# Patient Record
Sex: FEMALE | Race: OTHER | HISPANIC OR LATINO | URBAN - METROPOLITAN AREA
[De-identification: names, ages, dates, MRNs, and addresses within clinical notes are randomized per-mention and may not be internally consistent; named-entity substitution may affect disease eponyms.]

---

## 2020-08-22 ENCOUNTER — EMERGENCY (EMERGENCY)
Facility: HOSPITAL | Age: 55
LOS: 1 days | End: 2020-08-22
Attending: STUDENT IN AN ORGANIZED HEALTH CARE EDUCATION/TRAINING PROGRAM
Payer: MEDICAID

## 2020-08-22 VITALS
SYSTOLIC BLOOD PRESSURE: 120 MMHG | OXYGEN SATURATION: 95 % | TEMPERATURE: 98 F | WEIGHT: 169.98 LBS | DIASTOLIC BLOOD PRESSURE: 77 MMHG | HEART RATE: 101 BPM | RESPIRATION RATE: 20 BRPM | HEIGHT: 64 IN

## 2020-08-22 LAB
ALBUMIN SERPL ELPH-MCNC: 4.3 G/DL — SIGNIFICANT CHANGE UP (ref 3.3–5)
ALP SERPL-CCNC: 96 U/L — SIGNIFICANT CHANGE UP (ref 40–120)
ALT FLD-CCNC: 28 U/L — SIGNIFICANT CHANGE UP (ref 10–45)
ANION GAP SERPL CALC-SCNC: 14 MMOL/L — SIGNIFICANT CHANGE UP (ref 5–17)
APTT BLD: 24.5 SEC — LOW (ref 27.5–35.5)
AST SERPL-CCNC: 33 U/L — SIGNIFICANT CHANGE UP (ref 10–40)
BASOPHILS # BLD AUTO: 0.06 K/UL — SIGNIFICANT CHANGE UP (ref 0–0.2)
BASOPHILS NFR BLD AUTO: 0.5 % — SIGNIFICANT CHANGE UP (ref 0–2)
BILIRUB SERPL-MCNC: 0.3 MG/DL — SIGNIFICANT CHANGE UP (ref 0.2–1.2)
BUN SERPL-MCNC: 16 MG/DL — SIGNIFICANT CHANGE UP (ref 7–23)
CALCIUM SERPL-MCNC: 10.3 MG/DL — SIGNIFICANT CHANGE UP (ref 8.4–10.5)
CHLORIDE SERPL-SCNC: 101 MMOL/L — SIGNIFICANT CHANGE UP (ref 96–108)
CO2 SERPL-SCNC: 26 MMOL/L — SIGNIFICANT CHANGE UP (ref 22–31)
CREAT SERPL-MCNC: 1.18 MG/DL — SIGNIFICANT CHANGE UP (ref 0.5–1.3)
EOSINOPHIL # BLD AUTO: 0.24 K/UL — SIGNIFICANT CHANGE UP (ref 0–0.5)
EOSINOPHIL NFR BLD AUTO: 2.2 % — SIGNIFICANT CHANGE UP (ref 0–6)
GLUCOSE SERPL-MCNC: 131 MG/DL — HIGH (ref 70–99)
HCT VFR BLD CALC: 40.5 % — SIGNIFICANT CHANGE UP (ref 34.5–45)
HGB BLD-MCNC: 13.8 G/DL — SIGNIFICANT CHANGE UP (ref 11.5–15.5)
IMM GRANULOCYTES NFR BLD AUTO: 0.3 % — SIGNIFICANT CHANGE UP (ref 0–1.5)
INR BLD: 1.03 RATIO — SIGNIFICANT CHANGE UP (ref 0.88–1.16)
LYMPHOCYTES # BLD AUTO: 1.88 K/UL — SIGNIFICANT CHANGE UP (ref 1–3.3)
LYMPHOCYTES # BLD AUTO: 17.2 % — SIGNIFICANT CHANGE UP (ref 13–44)
MCHC RBC-ENTMCNC: 31.4 PG — SIGNIFICANT CHANGE UP (ref 27–34)
MCHC RBC-ENTMCNC: 34.1 GM/DL — SIGNIFICANT CHANGE UP (ref 32–36)
MCV RBC AUTO: 92 FL — SIGNIFICANT CHANGE UP (ref 80–100)
MONOCYTES # BLD AUTO: 0.6 K/UL — SIGNIFICANT CHANGE UP (ref 0–0.9)
MONOCYTES NFR BLD AUTO: 5.5 % — SIGNIFICANT CHANGE UP (ref 2–14)
NEUTROPHILS # BLD AUTO: 8.13 K/UL — HIGH (ref 1.8–7.4)
NEUTROPHILS NFR BLD AUTO: 74.3 % — SIGNIFICANT CHANGE UP (ref 43–77)
NRBC # BLD: 0 /100 WBCS — SIGNIFICANT CHANGE UP (ref 0–0)
PLATELET # BLD AUTO: 275 K/UL — SIGNIFICANT CHANGE UP (ref 150–400)
POTASSIUM SERPL-MCNC: 3.7 MMOL/L — SIGNIFICANT CHANGE UP (ref 3.5–5.3)
POTASSIUM SERPL-SCNC: 3.7 MMOL/L — SIGNIFICANT CHANGE UP (ref 3.5–5.3)
PROT SERPL-MCNC: 7.8 G/DL — SIGNIFICANT CHANGE UP (ref 6–8.3)
PROTHROM AB SERPL-ACNC: 12.2 SEC — SIGNIFICANT CHANGE UP (ref 10.6–13.6)
RBC # BLD: 4.4 M/UL — SIGNIFICANT CHANGE UP (ref 3.8–5.2)
RBC # FLD: 11.9 % — SIGNIFICANT CHANGE UP (ref 10.3–14.5)
SARS-COV-2 RNA SPEC QL NAA+PROBE: SIGNIFICANT CHANGE UP
SODIUM SERPL-SCNC: 141 MMOL/L — SIGNIFICANT CHANGE UP (ref 135–145)
TROPONIN T, HIGH SENSITIVITY RESULT: <6 NG/L — SIGNIFICANT CHANGE UP (ref 0–51)
WBC # BLD: 10.94 K/UL — HIGH (ref 3.8–10.5)
WBC # FLD AUTO: 10.94 K/UL — HIGH (ref 3.8–10.5)

## 2020-08-22 PROCEDURE — 71045 X-RAY EXAM CHEST 1 VIEW: CPT | Mod: 26

## 2020-08-22 PROCEDURE — 99220: CPT

## 2020-08-22 PROCEDURE — 70496 CT ANGIOGRAPHY HEAD: CPT | Mod: 26

## 2020-08-22 PROCEDURE — 70498 CT ANGIOGRAPHY NECK: CPT | Mod: 26

## 2020-08-22 PROCEDURE — 93010 ELECTROCARDIOGRAM REPORT: CPT

## 2020-08-22 RX ORDER — ATORVASTATIN CALCIUM 80 MG/1
80 TABLET, FILM COATED ORAL AT BEDTIME
Refills: 0 | Status: DISCONTINUED | OUTPATIENT
Start: 2020-08-22 | End: 2020-08-26

## 2020-08-22 RX ORDER — ASPIRIN/CALCIUM CARB/MAGNESIUM 324 MG
81 TABLET ORAL DAILY
Refills: 0 | Status: DISCONTINUED | OUTPATIENT
Start: 2020-08-22 | End: 2020-08-26

## 2020-08-22 RX ORDER — SODIUM CHLORIDE 9 MG/ML
1000 INJECTION, SOLUTION INTRAVENOUS ONCE
Refills: 0 | Status: DISCONTINUED | OUTPATIENT
Start: 2020-08-22 | End: 2020-08-22

## 2020-08-22 RX ADMIN — ATORVASTATIN CALCIUM 80 MILLIGRAM(S): 80 TABLET, FILM COATED ORAL at 22:25

## 2020-08-22 RX ADMIN — Medication 81 MILLIGRAM(S): at 22:25

## 2020-08-22 RX ADMIN — Medication 30 MILLILITER(S): at 19:52

## 2020-08-22 NOTE — ED ADULT NURSE NOTE - OBJECTIVE STATEMENT
PT 54 year old female, A/O x3. PT came in through triage via wheelchair due to slurred speech. PMH- HTN. PT was eating lunch around 1538 when she began to experience slurred speech and "stars" in her vision. PT stated "my daughter said my head fell back, but I do not recall that or slurred speech". Slurred speech resolved then occurred again and that is when PT family had PT come to ED. Upon entry to ED, slurred speech resolved and speech is clear and coherent. Complaining of headache, dizziness, and nausea. . Neuro assessment- pupils 3mm, round, reactive, full ROM, equal sensations , denies numbness/ tingling. States at baseline she see "floaters" in vision. Denies SOB, chest pain, fever, chills, cough, vomiting, diarrhea, dysuria. Interventions- placed on cardiac monitor and continuous pulse oximetry. PT 54 year old female, A/O x3. PT came in through triage via wheelchair due to slurred speech. PMH- HTN. PT was eating lunch around 1538 when she began to experience slurred speech and "stars" in her vision. PT stated "my daughter said my head fell back, but I do not recall that or slurred speech". Slurred speech resolved then occurred again and that is when PT family had PT come to ED. Upon entry to ED, slurred speech resolved and speech is clear and coherent. Complaining of headache, dizziness, generalized weakness, and nausea. . Neuro assessment- pupils 3mm, round, reactive, full ROM, equal sensations , denies numbness/ tingling. States at baseline she see "floaters" in vision. Denies SOB, chest pain, fever, chills, cough, vomiting, diarrhea, dysuria. Interventions- placed on cardiac monitor and continuous pulse oximetry.

## 2020-08-22 NOTE — ED CDU PROVIDER INITIAL DAY NOTE - DETAILS
Transient Slurred Speech  -TELE  -NEURO CHECKS  -MRI Head  -[facility] EVALS  -NEURO FOLLOWING  -CASE D/W ATTENDING Dr. Rueda

## 2020-08-22 NOTE — ED CDU PROVIDER INITIAL DAY NOTE - OBJECTIVE STATEMENT
53yo F hx htn p/w 2 episodes of slurred speech witnessed by daughter brief and self resolved. Resolved upon ED eval. Pt states that she "saw stars" but did not remember having slurred speech. No hx of similar sx in past. Asymptomatic currently. LKW 330pm. No HA, n/v, abd pain, dizziness, cp, sob, lightheadedness. 53yo F hx htn p/w 2 episodes of slurred speech witnessed by daughter brief and self resolved. Resolved upon ED eval. Pt states that she "saw stars" but did not remember having slurred speech. No hx of similar sx in past. Asymptomatic currently. LKW 330pm. No HA, n/v, abd pain, dizziness, cp, sob, lightheadedness.    In ED 53yo F hx htn p/w 2 episodes of slurred speech witnessed by daughter brief and self resolved. Resolved upon ED eval. Pt states that she "saw stars" but did not remember having slurred speech. No hx of similar sx in past. Asymptomatic currently. LKW 330pm. No HA, n/v, abd pain, dizziness, cp, sob, lightheadedness.    In ED pt was code stroke upon arrival. CTH/CTA head/neck negative for acute stroke. Labs non-actionable. CXR clear lungs. Neuro evaluated pt, recommended MRI. Pt sent to CDU fro MRI head, neuro checks, tele monitoring, and continued evaluation. Case d/w ED attending Dr. Rueda.

## 2020-08-22 NOTE — ED CDU PROVIDER INITIAL DAY NOTE - ATTENDING CONTRIBUTION TO CARE
Attending MD Rueda:   I personally have seen and examined this patient.  ACP, Resident, medical student note reviewed and agree on plan of care and except where noted.     see me emergency department provider note from same day for details.

## 2020-08-22 NOTE — ED PROVIDER NOTE - ATTENDING CONTRIBUTION TO CARE
Attending MD Rueda:   I personally have seen and examined this patient.  ACP, Resident, medical student note reviewed and agree on plan of care and except where noted.     54y F PMH HTN presents with dysarthria. LKN 3:30pm. Patient daughter who is a nurse, patient developed slurred speech, lasting several minutes, self-resolved, recurred several minutes later. Patient has no recollection of this event.    On exam patient is well appearing, tachycardic otherwise vitals wnl, tachy regular s1s2, lungs clear, abdomen soft ntnd, NIHSS 0, no neuro deficits, fluid speech, A+O x 3.    Concern for TIA, etiology of tachycardia unclear. Code Stroke called, will obtain imaging, labs, reassess.

## 2020-08-22 NOTE — ED PROVIDER NOTE - OBJECTIVE STATEMENT
53yo F hx htn p/w 2 episodes of slurred speech witnessed by daughter brief and self resolved. Resolved upon ED eval. Pt states that she "saw stars" but did not remember having slurred speech. No hx of similar sx in past. Asymptomatic currently. LKW 330pm. No HA, n/v, abd pain, dizziness, cp, sob, lightheadedness.

## 2020-08-22 NOTE — ED ADULT NURSE NOTE - NS_ED_NURSE_TEACHING_TOPIC_ED_A_ED
signs & symptoms of a stroke-->call 911, education done with teach back, medication compliance, follow-up care.

## 2020-08-22 NOTE — ED CDU PROVIDER INITIAL DAY NOTE - PROGRESS NOTE DETAILS
Pt just arrived in CDU after covid resulted (negative). Patient seen at bedside in NAD.  VSS.  Patient resting comfortably without complaints. Will put on tele monitor. Pt denies slurred speech, vision changes, numbness/tingling, weakness, difficulty walking. Appears well. Neuro exam intact, no focal deficits or abnormal findings. Will continue to monitor. - Jamison De Jesus PA-C

## 2020-08-22 NOTE — ED ADULT NURSE REASSESSMENT NOTE - NS ED NURSE REASSESS COMMENT FT1
Report received from ANIA Ogden. Patient A&Ox4, breathing spontaneous and unlabored, vitals stable, given maalox as per MD orders. Patient comfortable otherwise. Denies pain. Bed locked and in lowest position for safety, awaiting covid result prior to going to CDU. Bed locked and in lowest position for safety.
Daughter, float nurse from upstairs, at bed side, assisting PT to ambulate to bathroom as approved by ED Resident Alexander Moralez and ED MD Roopa Rueda. PT states "I feel slightly lethargic".
2300 pm Patient fully alert and ambulatory, denies pain or discomfort, No neuro deficits noted. (see neuro sheet). calm and appropriated. on the phone with family.

## 2020-08-22 NOTE — ED CDU PROVIDER INITIAL DAY NOTE - FAMILY HISTORY
Mother  Still living? Unknown  Family history of stroke, Age at diagnosis: Age Unknown  Family history of TIAs, Age at diagnosis: Age Unknown     Aunt  Still living? Unknown  Family history of stroke, Age at diagnosis: Age Unknown  Family history of TIAs, Age at diagnosis: Age Unknown

## 2020-08-22 NOTE — ED PROVIDER NOTE - PHYSICAL EXAMINATION
Gen: Well appearing, NAD  Head: NCAT  HEENT: PERRL, MMM, normal conjunctiva, anicteric, neck supple  Lung: CTAB, no adventitious sounds  CV: mildly tachycardic, no murmurs  Abd: soft, NTND, no rebound or guarding, no CVAT  MSK: No edema, no visible deformities  Neuro: CN II-XII grossly intact. 5/5 strength and normal sensation in all extremities. Ambulatory with stable gait.  fluid speech.  Skin: Warm and dry, no evidence of rash  Psych: normal mood and affect

## 2020-08-22 NOTE — CONSULT NOTE ADULT - ASSESSMENT
53yo woman with PMH of HTN presents as code stroke with slurring of speech today. Patient reports feeling lethargic and seeing flashing circles. Daughter noted 2 episodes of slurred speech with an episode where her head fell back with ?loc. LKN at 1538h. NIHSS of 0. Pre-MRS of 0. CT head and CTA were unremarkable. Patient is not a candidate for tPA or thrombectomy since symptoms have resolved and there is no LVO.    Impression: Transient dysarthria and visual disturbance due to unknown etiology, now resolved, possibly due to metabolic encephalopathy vs. TIA.    Plan:  [] permissive HTN for 24 hours up to 220/110  [] gradual normotension after 24 hrs  [] telemetry monitoring    Imaging   [x] CT head  [x] CTA H&N  [] MRI head non contrast. Can perform outpatient with Dr. Armas in 1-2 days. [phone]  [] STAT repeat CTH if change in neuro status  [] loop recorder outpt vs inpatient    Meds  [] ASA 81 daily  [] start atorva 80 mg daily, titrate based on lipid profile  [] DVT ppx    Studies  [] TTE with bubble inpatient vs outpatient    Labs   [] CBC, BMP  [] Lipid panel  [] HbA1C    Case discussed with Dr. Worrell. 55yo woman with PMH of HTN presents as code stroke with slurring of speech today. Patient reports feeling lethargic and seeing flashing circles. Daughter noted 2 episodes of slurred speech with an episode where her head fell back with ?loc. LKN at 1538h. NIHSS of 0. Pre-MRS of 0. CT head and CTA were unremarkable. Patient is not a candidate for tPA or thrombectomy since symptoms have resolved and there is no LVO.    Impression: Transient dysarthria and visual disturbance due to unknown etiology, now resolved, possibly due to metabolic encephalopathy vs. TIA.    Plan:  [] permissive HTN for 24 hours up to 220/110  [] gradual normotension after 24 hrs  [] telemetry monitoring    Imaging   [x] CT head  [x] CTA H&N  [] MRI head non contrast. Can perform outpatient with Dr. Armas in 1-2 days. [phone]  [] STAT repeat CTH if change in neuro status  [] loop recorder outpt vs inpatient      *** Stroke fellow addendum***  MRI brain was reviewed, no sign of acute abnormality was seen. Previously CTH and CTA H&N were also normal. Transient dysarthria with lethargy are also not localizing findings. Would recommend for an outpatient follow up, st this point patient's symptoms are not considered to neurological, however further outpatient work up and follow up is necessary.   Meds  [] ASA 81 daily  [] start atorva 80 mg daily, titrate based on lipid profile  [] DVT ppx    Studies  [] TTE with bubble inpatient vs outpatient    Labs   [] CBC, BMP  [] Lipid panel  [] HbA1C    Case discussed with Dr. Worrell.

## 2020-08-22 NOTE — CONSULT NOTE ADULT - SUBJECTIVE AND OBJECTIVE BOX
Neurology  Consult Note  08-22-20    Name:  CHANELL VILLALOBOS; 54y (1965)    Neurology consult: 53yo woman with PMH of HTN presents as code stroke with slurring of speech today. Patient reports she was having lunch with her daughter, who is a nurse at [employer], when she felt lethargic and saw flashing circles. Patient reports her daughter noted slurring of speech and that her head fell back with ?loc. Symptoms resolved but the slurring of speech occurred again with LKN at 1538h. Patient does not recall either event of slurred speech but she felt hot when the events occurred. Patient in the ED reports to have persistent lethargy, but denies visual disturbances, blurry or double vision, headache, focal weakness, numbness, or changes in speech or difficulty swallowing. Patient performs ADLs independently and takes aspirin occasionally. NIHSS of 0. Pre-MRS of 0. CT head and CTA were unremarkable. Patient is not a candidate for tPA or thrombectomy since symptoms have resolved and there is no LVO.      Review of Systems:  As states in HPI.        PMHx: HTN    Vitals:  T(C): 36.8 (08-22-20 @ 18:27), Max: 37.1 (08-22-20 @ 17:13)  HR: 89 (08-22-20 @ 18:27) (89 - 116)  BP: 129/92 (08-22-20 @ 18:27) (120/77 - 142/79)  RR: 21 (08-22-20 @ 18:27) (18 - 21)  SpO2: 99% (08-22-20 @ 18:27) (95% - 99%)    Labs:                        13.8   10.94 )-----------( 275      ( 22 Aug 2020 17:02 )             40.5     08-22    141  |  101  |  16  ----------------------------<  131<H>  3.7   |  26  |  1.18    Ca    10.3      22 Aug 2020 17:02    TPro  7.8  /  Alb  4.3  /  TBili  0.3  /  DBili  x   /  AST  33  /  ALT  28  /  AlkPhos  96  08-22    CAPILLARY BLOOD GLUCOSE  POCT Blood Glucose.: 135 mg/dL (22 Aug 2020 16:54)    LIVER FUNCTIONS - ( 22 Aug 2020 17:02 )  Alb: 4.3 g/dL / Pro: 7.8 g/dL / ALK PHOS: 96 U/L / ALT: 28 U/L / AST: 33 U/L / GGT: x             PT/INR - ( 22 Aug 2020 17:02 )   PT: 12.2 sec;   INR: 1.03 ratio    PTT - ( 22 Aug 2020 17:02 )  PTT:24.5 sec      PHYSICAL EXAMINATION:  General: Well-developed, well nourished, in no acute distress.  Eyes: Conjunctiva and sclera clear.  Neurologic:  - Mental Status:  Alert, awake, oriented to person, place, and time; Speech is fluent with intact naming, repetition, and comprehension; Good overall fund of knowledge  - Cranial Nerves II-XII:    II:  Visual fields are full to confrontation; Pupils are equal, round, and reactive to light  III, IV, VI:  Extraocular movements are intact without nystagmus.  V:  Facial sensation is intact in the V1-V3 distribution bilaterally.  VII:  Face is symmetric with normal eye closure and smile  VIII:  Hearing is intact to finger rub.  IX, X:  Uvula is midline and soft palate rises symmetrically  XI:  Head turning and shoulder shrug are intact.  XII:  Tongue protrudes in the midline.  - Motor:  Strength is 5/5 throughout.  There is no pronator drift.  Normal muscle bulk and tone throughout.  - Reflexes:  2+ and symmetric at the biceps, triceps, brachioradialis, knees, and ankles.  Plantar responses is down in the L and mute in the R.  - Sensory:  Intact throughout to light touch and pin prick.  - Gait:   Deferred    Radiology:  < from: CT Angio Head w/ IV Cont (08.22.20 @ 17:13) >  IMPRESSION:  Noncontrast CT brain:  No acute intracranial mass effect, hemorrhage, midline shift or extra-axial fluid collection.    CT angiography neck:  No evidence of hemodynamically significant stenosis using NASCET criteria. Patent vertebral arteries. No evidence of vascular dissection.    CT angiography brain:  No major vessel occlusion, proximal stenosis or aneurysm.

## 2020-08-23 VITALS
TEMPERATURE: 98 F | RESPIRATION RATE: 17 BRPM | HEART RATE: 72 BPM | OXYGEN SATURATION: 98 % | DIASTOLIC BLOOD PRESSURE: 75 MMHG | SYSTOLIC BLOOD PRESSURE: 117 MMHG

## 2020-08-23 LAB
A1C WITH ESTIMATED AVERAGE GLUCOSE RESULT: 4.8 % — SIGNIFICANT CHANGE UP (ref 4–5.6)
CHOLEST SERPL-MCNC: 159 MG/DL — SIGNIFICANT CHANGE UP (ref 10–199)
ESTIMATED AVERAGE GLUCOSE: 91 MG/DL — SIGNIFICANT CHANGE UP (ref 68–114)
HDLC SERPL-MCNC: 61 MG/DL — SIGNIFICANT CHANGE UP
LIPID PNL WITH DIRECT LDL SERPL: 86 MG/DL — SIGNIFICANT CHANGE UP
TOTAL CHOLESTEROL/HDL RATIO MEASUREMENT: 2.6 RATIO — LOW (ref 3.3–7.1)
TRIGL SERPL-MCNC: 62 MG/DL — SIGNIFICANT CHANGE UP (ref 10–149)

## 2020-08-23 PROCEDURE — 80053 COMPREHEN METABOLIC PANEL: CPT

## 2020-08-23 PROCEDURE — 99285 EMERGENCY DEPT VISIT HI MDM: CPT | Mod: 25

## 2020-08-23 PROCEDURE — 70496 CT ANGIOGRAPHY HEAD: CPT

## 2020-08-23 PROCEDURE — 99217: CPT

## 2020-08-23 PROCEDURE — U0003: CPT

## 2020-08-23 PROCEDURE — 84484 ASSAY OF TROPONIN QUANT: CPT

## 2020-08-23 PROCEDURE — G0378: CPT

## 2020-08-23 PROCEDURE — 70551 MRI BRAIN STEM W/O DYE: CPT | Mod: 26

## 2020-08-23 PROCEDURE — 71045 X-RAY EXAM CHEST 1 VIEW: CPT

## 2020-08-23 PROCEDURE — 85027 COMPLETE CBC AUTOMATED: CPT

## 2020-08-23 PROCEDURE — 85730 THROMBOPLASTIN TIME PARTIAL: CPT

## 2020-08-23 PROCEDURE — 83036 HEMOGLOBIN GLYCOSYLATED A1C: CPT

## 2020-08-23 PROCEDURE — 70498 CT ANGIOGRAPHY NECK: CPT

## 2020-08-23 PROCEDURE — 93005 ELECTROCARDIOGRAM TRACING: CPT

## 2020-08-23 PROCEDURE — 80061 LIPID PANEL: CPT

## 2020-08-23 PROCEDURE — 70551 MRI BRAIN STEM W/O DYE: CPT

## 2020-08-23 PROCEDURE — 85610 PROTHROMBIN TIME: CPT

## 2020-08-23 PROCEDURE — 70450 CT HEAD/BRAIN W/O DYE: CPT

## 2020-08-23 PROCEDURE — 82962 GLUCOSE BLOOD TEST: CPT

## 2020-08-23 RX ORDER — ALPRAZOLAM 0.25 MG
0.25 TABLET ORAL ONCE
Refills: 0 | Status: DISCONTINUED | OUTPATIENT
Start: 2020-08-23 | End: 2020-08-23

## 2020-08-23 RX ADMIN — Medication 0.25 MILLIGRAM(S): at 08:44

## 2020-08-23 NOTE — ED CDU PROVIDER DISPOSITION NOTE - PATIENT PORTAL LINK FT
You can access the FollowMyHealth Patient Portal offered by [facility] by registering at the following website: [email or web address]. By joining [facility]’s FollowMyHealth portal, you will also be able to view your health information using other applications (apps) compatible with our system.

## 2020-08-23 NOTE — ED CDU PROVIDER SUBSEQUENT DAY NOTE - SOCIAL CONCERNS
[facility]    Phone Message    May a detailed message be left on voicemail: yes    Reason for Call: Symptoms or Concerns     If patient has red-flag symptoms, warm transfer to triage line    Current symptom or concern: Patient states she still has not received a call back back her CT and lab results and is still experiencing pain on her neck. Please call patient back.      Action Taken: Message routed to:  [facility] ([facility] Rheumatology     None

## 2020-08-23 NOTE — ED CDU PROVIDER DISPOSITION NOTE - CLINICAL COURSE
53 yo female PMHx HTN p/w 2 episodes of slurred speech witnessed by daughter while sitting at lunch. Per daughter pt began slurring words and seemed to be "in a daze", head rolled back (no seizure activity or LOC) and symptoms spontaneously resolved after a few seconds. About half hour later same incident occurred again and spontaneously resolved. Sxs were resolved upon ED eval. Pt states that she "saw stars" but did not remember having slurred speech. No hx of similar sx in past. LKW 330pm. No HA, n/v, abd pain, dizziness, cp, sob, lightheadedness. +family hx of strokes (mother and aunt)  ED Course: Code stroke called upon arrival. CTH/CTA head/neck negative for acute stroke. Labs non-actionable. CXR clear lungs. Neuro evaluated pt, recommended MRI. Pt sent to CDU fro MRI head, neuro checks, tele monitoring, and continued evaluation. Pt did well in CDU overnight. She had no adverse events on tele and sh remained asymptomatic overnight w/ no return of her presenting symptoms. Neuro examination remained unremarkable w/ no focal deficits or abnormal findings. MRI resulted as ____________________. Neurology re-evaluated pt in AM w/ their attending and recommended _____________. 55 yo female PMHx HTN p/w 2 episodes of slurred speech witnessed by daughter while sitting at lunch. Per daughter pt began slurring words and seemed to be "in a daze", head rolled back (no seizure activity or LOC) and symptoms spontaneously resolved after a few seconds. About half hour later same incident occurred again and spontaneously resolved. Sxs were resolved upon ED eval. Pt states that she "saw stars" but did not remember having slurred speech. No hx of similar sx in past. LKW 330pm. No HA, n/v, abd pain, dizziness, cp, sob, lightheadedness. +family hx of strokes (mother and aunt)  ED Course: Code stroke called upon arrival. CTH/CTA head/neck negative for acute stroke. Labs non-actionable. CXR clear lungs. Neuro evaluated pt, recommended MRI. Pt sent to CDU fro MRI head, neuro checks, tele monitoring, and continued evaluation. Pt did well in CDU overnight. She had no adverse events on tele and sh remained asymptomatic overnight w/ no return of her presenting symptoms. Neuro examination remained unremarkable w/ no focal deficits or abnormal findings. MRI returned as a normal study. Discussed with Neuro resident who cleared pt for d/c w/ outpt follow up. Seen by Dr. Sutherland who agrees w/ d/c home

## 2020-08-23 NOTE — ED CDU PROVIDER DISPOSITION NOTE - NSDCPRINTRESULTS_ED_ALL_ED
Patient requests all Lab and Radiology Results on their Discharge Instructions
(3) no apparent problem

## 2020-08-23 NOTE — ED CDU PROVIDER SUBSEQUENT DAY NOTE - NEUROLOGICAL MOTOR
Problem: Respiratory Impairment - Respiratory Therapy 253  Intervention: Inhaled medication delivery  Intervention Status  Done         normal

## 2020-08-23 NOTE — ED CDU PROVIDER SUBSEQUENT DAY NOTE - ATTENDING CONTRIBUTION TO CARE
Patient seen and examined at bedside. Patient is awake,alert,oriented x 3. Patient is well appearing and in no acute distress. Patient's chest is clear to ausculation, +s1s2. Abdomen is soft nd/nt +BS. Extremity with no swelling or calf tenderness. CN2-12 intact 5/5 UE/LE nml sensation. Stable gait. Labs and MR results reviewed. patient states she feels better, no episodes of slurred speech. Discharge plan discussed with patient and in agreement with plan.

## 2020-08-23 NOTE — ED CDU PROVIDER SUBSEQUENT DAY NOTE - CRANIAL NERVE AND PUPILLARY EXAM
No dysmetria or dysdiadochokinesia/tongue is midline/cranial nerves 2-12 intact/extra-ocular movements intact

## 2020-08-23 NOTE — ED CDU PROVIDER SUBSEQUENT DAY NOTE - HISTORY
53 yo female currently being observed in CDU overnight for episode of transient slurred speech, awaiting MRI in AM. Patient seen at bedside in NAD.  VSS.  Patient resting comfortably without complaints. Sinus rhythm on tele w/ no adverse events. No interval changes from previous CDU note. Pt denies headache, speech/visual changes, numbness/tingling, n/v/d, weakness, difficulty walking. Has been ambulating around unit to bathroom w/o difficulty. Neuro exam intact, no focal deficits or abnormal findings. Will continue to monitor. - Jamison De Jesus PA-C

## 2020-08-23 NOTE — ED CDU PROVIDER DISPOSITION NOTE - NSFOLLOWUPINSTRUCTIONS_ED_ALL_ED_FT
1. Follow up with your PMD in 1-2 days. Additionally please follow up with neurologist Dr. Keaton Armas [phone] in 2-3 days for further evaluation/management regarding your symptoms.   2. Rest, keep self well hydrated. Continue home medications as prescribed, Stroke education packet was given to you for further information.   3. Start Aspirin 81 mg daily. Start Lipitor as prescribed.  4. If you develop any new/worsening symptoms including weakness, numbness/tingling, change in speech or vision, problems walking or any other concerning symptoms please return to the ED immediately.

## 2020-08-23 NOTE — ED CDU PROVIDER SUBSEQUENT DAY NOTE - PROGRESS NOTE DETAILS
Patient seen at bedside in NAD.  VSS.  Patient resting comfortably without complaints. No interval changes from previous CDU note. Pt feels well. Denies headache, dizziness, weakness, n/v, speech/visual changes. Neuro exam unremarkable, no focal deficits or abnormal findings. Plan for MRI this AM. Will continue to monitor. - Jamison De Jesus PA-C CDU NOTE SAMIRA Erickson: VSS NAD. Patient is resting comfortably and is without any complaints. Neuro exam remains unchanged. Pending MRI MRI returned as a normal study. Discussed with Neuro resident who cleared pt for d/c w/ outpt follow up. Seen by Dr. Sutherland who agrees w/ d/c home   Haley Erickson PA-C

## 2020-08-23 NOTE — ED CDU PROVIDER DISPOSITION NOTE - CARE PROVIDER_API CALL
Keaton Armas  NEUROLOGY  [address], [address]  [place] [address]  Phone: [phone]  Fax: [phone]  Follow Up Time:

## 2021-01-27 PROBLEM — I10 ESSENTIAL (PRIMARY) HYPERTENSION: Chronic | Status: ACTIVE | Noted: 2020-08-22

## 2021-02-04 ENCOUNTER — APPOINTMENT (OUTPATIENT)
Dept: OBGYN | Facility: CLINIC | Age: 56
End: 2021-02-04

## 2021-06-05 ENCOUNTER — APPOINTMENT (OUTPATIENT)
Dept: OBGYN | Facility: CLINIC | Age: 56
End: 2021-06-05

## 2021-06-10 ENCOUNTER — NON-APPOINTMENT (OUTPATIENT)
Age: 56
End: 2021-06-10

## 2021-06-10 ENCOUNTER — APPOINTMENT (OUTPATIENT)
Dept: OBGYN | Facility: CLINIC | Age: 56
End: 2021-06-10
Payer: MEDICAID

## 2021-06-10 PROCEDURE — 99386 PREV VISIT NEW AGE 40-64: CPT

## 2021-09-14 PROBLEM — Z00.00 ENCOUNTER FOR PREVENTIVE HEALTH EXAMINATION: Status: ACTIVE | Noted: 2021-09-14

## 2022-05-02 ENCOUNTER — APPOINTMENT (OUTPATIENT)
Dept: DERMATOLOGY | Facility: CLINIC | Age: 57
End: 2022-05-02
Payer: MEDICAID

## 2022-05-02 ENCOUNTER — NON-APPOINTMENT (OUTPATIENT)
Age: 57
End: 2022-05-02

## 2022-05-02 ENCOUNTER — TRANSCRIPTION ENCOUNTER (OUTPATIENT)
Age: 57
End: 2022-05-02

## 2022-05-02 VITALS — WEIGHT: 185 LBS | HEIGHT: 64 IN | BODY MASS INDEX: 31.58 KG/M2

## 2022-05-02 DIAGNOSIS — D23.9 OTHER BENIGN NEOPLASM OF SKIN, UNSPECIFIED: ICD-10-CM

## 2022-05-02 PROCEDURE — 99203 OFFICE O/P NEW LOW 30 MIN: CPT

## 2022-06-08 ENCOUNTER — APPOINTMENT (OUTPATIENT)
Dept: DERMATOLOGY | Facility: CLINIC | Age: 57
End: 2022-06-08

## 2024-05-02 ENCOUNTER — APPOINTMENT (OUTPATIENT)
Dept: DERMATOLOGY | Facility: CLINIC | Age: 59
End: 2024-05-02
Payer: MEDICAID

## 2024-05-02 DIAGNOSIS — D48.5 NEOPLASM OF UNCERTAIN BEHAVIOR OF SKIN: ICD-10-CM

## 2024-05-02 PROCEDURE — 99213 OFFICE O/P EST LOW 20 MIN: CPT | Mod: 25

## 2024-05-02 PROCEDURE — 11102 TANGNTL BX SKIN SINGLE LES: CPT

## 2024-05-06 ENCOUNTER — NON-APPOINTMENT (OUTPATIENT)
Age: 59
End: 2024-05-06

## 2024-05-07 LAB — DERMATOLOGY BIOPSY: NORMAL

## 2024-05-20 ENCOUNTER — APPOINTMENT (OUTPATIENT)
Dept: DERMATOLOGY | Facility: CLINIC | Age: 59
End: 2024-05-20
Payer: MEDICAID

## 2024-05-20 DIAGNOSIS — R21 RASH AND OTHER NONSPECIFIC SKIN ERUPTION: ICD-10-CM

## 2024-05-20 DIAGNOSIS — I87.2 VENOUS INSUFFICIENCY (CHRONIC) (PERIPHERAL): ICD-10-CM

## 2024-05-20 DIAGNOSIS — L82.1 OTHER SEBORRHEIC KERATOSIS: ICD-10-CM

## 2024-05-20 DIAGNOSIS — L85.3 XEROSIS CUTIS: ICD-10-CM

## 2024-05-20 PROCEDURE — 99213 OFFICE O/P EST LOW 20 MIN: CPT

## 2024-05-20 NOTE — PHYSICAL EXAM
[Alert] : alert [Oriented x 3] : ~L oriented x 3 [Well Nourished] : well nourished [Conjunctiva Non-injected] : conjunctiva non-injected [No Visual Lymphadenopathy] : no visual  lymphadenopathy [No Clubbing] : no clubbing [No Edema] : no edema [No Bromhidrosis] : no bromhidrosis [No Chromhidrosis] : no chromhidrosis [Declined] : declined [FreeTextEntry3] : Focused exam only (see below) per patient request: - waxy stuck-on brown papule on the left lower leg  - xerosis on BLE, with varicosities of the lower extremities  - several red-brown monomorphic macules on the lower shins b/l coalescing in areas into patches

## 2024-05-20 NOTE — HISTORY OF PRESENT ILLNESS
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

## 2024-05-20 NOTE — ASSESSMENT
[FreeTextEntry1] : 1. Rash on R breast - acute flare, now resolved  - No further treatment warranted as rash has resolved, advised pt to RTC if recurs   2. Xerosis 3. Varicosities on the lower extremities with stasis changes  - Discussed nature and course of condition with patient. - Discussed importance of liberal moisturization multiple times per day with OTC emollient/cream (e.g. Cerave cream, plain Vaseline)  - Dry skin care reviewed: shower no more than once daily for 5-10 minutes with lukewarm water, gentle soaps, products free of fragrances  4. Seborrheic keratosis  - Discussed nature of these benign lesions  - Related to genetics - these lesions run in families; NOT related to sun exposure

## 2024-05-21 NOTE — PHYSICAL EXAM
[FreeTextEntry3] : Focused skin exam performed  The relevant portions of the exam were performed today  AAOx3, NAD, well-appearing / pleasant Focused examination within normal limits with the exception of:  Large skin-colored pedunculated nodule on the right medial thigh

## 2024-05-21 NOTE — ASSESSMENT
[FreeTextEntry1] : 1. Neoplasm of unknown biological significance, on the right medial thigh - Suspicious for acrochrodon - Biopsy was performed today for histologic correlation. Will contact Patient with results and plan treatment considering histologic findings. - r/o acrochordon Shave Biopsy: All side effects including pain, bleeding, infection, scar, recurrence, nerve damage were discussed and consent was obtained. We anesthetized the area with 1% lidocaine/epinephrine. The lesion was biopsied with a Dermablade. Hemostasis was achieved with Drysol. Wound care were provided and the tissue was submitted to pathology for evaluation.  RTC PRN.

## 2024-05-21 NOTE — HISTORY OF PRESENT ILLNESS
[FreeTextEntry1] : rpa: skin tag [de-identified] : 58F last seen May 2022 by Dr. Hoffman, presenting today for evaluation of the followin. Skin tag on the right thigh x years. Enlarging, irritated, bothersome. Requests removal.   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

## 2024-11-23 ENCOUNTER — APPOINTMENT (OUTPATIENT)
Dept: ULTRASOUND IMAGING | Facility: IMAGING CENTER | Age: 59
End: 2024-11-23

## 2024-11-23 ENCOUNTER — OUTPATIENT (OUTPATIENT)
Dept: OUTPATIENT SERVICES | Facility: HOSPITAL | Age: 59
LOS: 1 days | End: 2024-11-23
Payer: MEDICAID

## 2024-11-23 DIAGNOSIS — Z00.8 ENCOUNTER FOR OTHER GENERAL EXAMINATION: ICD-10-CM

## 2024-11-23 PROCEDURE — 93975 VASCULAR STUDY: CPT | Mod: 26

## 2024-11-23 PROCEDURE — 76830 TRANSVAGINAL US NON-OB: CPT

## 2024-11-23 PROCEDURE — 93975 VASCULAR STUDY: CPT

## 2024-11-23 PROCEDURE — 76856 US EXAM PELVIC COMPLETE: CPT

## 2024-11-23 PROCEDURE — 76856 US EXAM PELVIC COMPLETE: CPT | Mod: 26,59

## 2024-11-23 PROCEDURE — 76830 TRANSVAGINAL US NON-OB: CPT | Mod: 26
